# Patient Record
Sex: MALE | Race: BLACK OR AFRICAN AMERICAN | URBAN - METROPOLITAN AREA
[De-identification: names, ages, dates, MRNs, and addresses within clinical notes are randomized per-mention and may not be internally consistent; named-entity substitution may affect disease eponyms.]

---

## 2018-09-13 ENCOUNTER — OFFICE VISIT (OUTPATIENT)
Dept: INTERNAL MEDICINE CLINIC | Age: 23
End: 2018-09-13

## 2018-09-13 ENCOUNTER — HOSPITAL ENCOUNTER (OUTPATIENT)
Dept: LAB | Age: 23
Discharge: HOME | End: 2018-09-13
Payer: SELF-PAY

## 2018-09-13 VITALS
OXYGEN SATURATION: 97 % | HEIGHT: 68 IN | RESPIRATION RATE: 18 BRPM | WEIGHT: 116 LBS | HEART RATE: 104 BPM | DIASTOLIC BLOOD PRESSURE: 84 MMHG | BODY MASS INDEX: 17.58 KG/M2 | SYSTOLIC BLOOD PRESSURE: 126 MMHG | TEMPERATURE: 98.1 F

## 2018-09-13 DIAGNOSIS — B20 HISTORY OF HIV INFECTION (HCC): ICD-10-CM

## 2018-09-13 DIAGNOSIS — Z20.2 EXPOSURE TO STD: ICD-10-CM

## 2018-09-13 DIAGNOSIS — Z20.2 EXPOSURE TO STD: Primary | ICD-10-CM

## 2018-09-13 DIAGNOSIS — L75.0 URINARY BODY ODOR: ICD-10-CM

## 2018-09-13 PROCEDURE — 87491 CHLMYD TRACH DNA AMP PROBE: CPT | Performed by: NURSE PRACTITIONER

## 2018-09-13 NOTE — PROGRESS NOTES
HISTORY OF PRESENT ILLNESS  Mariela Villegas is a 20 y.o. male. Patient presents for STD check. States he had unprotected sex with a single male partner whom he believes has a STD and who is HIV positive. He  moved from [facility] where he was seeing infectious disease for his HIV,would like to be referred to ID since he has now relocated to [place]. He also complaints of urinary odor but denies any dysuria,fever,chills, penile lesion,abd pain,NVD,dizziness,SOb,CP. Limited assessment,patient left the room stating he wanted to receive a phone and insisted he had to go outside to receive the call. Attempted to dissuade patient to receive the call in a private area in clinic,but he left and never returned. He had already provided urine specimen, was awaiting physical exam.    HPI    Review of Systems   Constitutional: Negative for chills, diaphoresis, fever, malaise/fatigue and weight loss. HENT: Negative. Eyes: Negative. Respiratory: Negative. Cardiovascular: Negative. Gastrointestinal: Negative. Genitourinary:        Foul urinary odor   Musculoskeletal: Negative. Neurological: Negative. Psychiatric/Behavioral: Negative. Physical Exam   Constitutional: He is oriented to person, place, and time. He appears well-developed. Pulmonary/Chest: Effort normal.   Genitourinary:   Genitourinary Comments: Denies any penile lesion,discharge. Incomplete assessment as patient left before the end of the visit. Neurological: He is alert and oriented to person, place, and time. ASSESSMENT and PLAN    ICD-10-CM ICD-9-CM    1. Exposure to STD Z20.2 V01.6 T PALLIDUM AB      CHLAMYDIA/GC AMPLIFICATON THINPREP   2. History of HIV infection Z86.19 V12.09 REFERRAL TO INFECTIOUS DISEASE   3. Urinary body odor L75.0 705.89 AMB POC URINALYSIS DIP STICK MANUAL W/O MICRO     Encounter Diagnoses   Name Primary?     Exposure to STD Yes    History of HIV infection     Urinary body odor      Orders Placed This Encounter    T PALLIDUM AB    REFERRAL TO INFECTIOUS DISEASE    AMB POC URINALYSIS DIP STICK MANUAL W/O MICRO    CHLAMYDIA/GC AMPLIFICATON THINPREP     Orders Placed This Encounter    T PALLIDUM AB     Standing Status:   Future     Standing Expiration Date:   9/14/2019    REFERRAL TO INFECTIOUS DISEASE     Referral Priority:   Routine     Referral Type:   Consultation     Referral Reason:   Specialty Services Required    AMB POC URINALYSIS DIP STICK MANUAL W/O MICRO    CHLAMYDIA/GC AMPLIFICATON THINPREP     Standing Status:   Future     Standing Expiration Date:   9/14/2019     Diagnoses and all orders for this visit:    1. Exposure to STD  -     T PALLIDUM AB; Future  -     CHLAMYDIA/GC AMPLIFICATON THINPREP; Future    2. History of HIV infection  -     REFERRAL TO INFECTIOUS DISEASE    3. Urinary body odor  -     AMB POC URINALYSIS DIP STICK MANUAL W/O MICRO      Follow-up Disposition: Not on File  the following changes in treatment are made: Will proceed with test for GC , T Pallidum will not be cancelled should patient return,refer to ID,call with results.

## 2018-09-13 NOTE — PROGRESS NOTES
Patient presents for STD check. States he had unprotected sex with a single male partner whom he believes has a STD and who is HIV positive. He  moved from [facility] where he was seeing infectious disease for his HIV,would like to be referred to ID since he has now relocated to [place]. He also complaints of urinary odor but denies any dysuria,fever,chills, penile lesion,abd pain,NVD,dizziness,SOb,CP.

## 2018-09-14 LAB
C TRACH RRNA SPEC QL NAA+PROBE: NEGATIVE
N GONORRHOEA RRNA SPEC QL NAA+PROBE: NEGATIVE
SPECIMEN SOURCE: NORMAL

## 2018-09-18 ENCOUNTER — TELEPHONE (OUTPATIENT)
Dept: INTERNAL MEDICINE CLINIC | Age: 23
End: 2018-09-18

## 2018-09-18 NOTE — TELEPHONE ENCOUNTER
----- Message from Dianne Hernández NP sent at 9/18/2018 10:32 AM EDT -----  Please inform patient that his test for GC chlamydia was negative.

## 2018-09-18 NOTE — TELEPHONE ENCOUNTER
Contacted pt at [facility] number. Two patient Identifiers confirmed. Advised pt per Balwinder Zuluaga notes. Pt verbalized understanding.

## 2018-09-18 NOTE — TELEPHONE ENCOUNTER
Attempted to contact pt at numbers listed below,no answer lvm to return call to office. Will attempt to contact pt again.